# Patient Record
Sex: MALE | Race: OTHER | Employment: UNEMPLOYED | URBAN - METROPOLITAN AREA
[De-identification: names, ages, dates, MRNs, and addresses within clinical notes are randomized per-mention and may not be internally consistent; named-entity substitution may affect disease eponyms.]

---

## 2024-02-16 ENCOUNTER — APPOINTMENT (OUTPATIENT)
Dept: GENERAL RADIOLOGY | Age: 24
End: 2024-02-16

## 2024-02-16 ENCOUNTER — HOSPITAL ENCOUNTER (EMERGENCY)
Age: 24
Discharge: HOME | End: 2024-02-16
Attending: EMERGENCY MEDICINE

## 2024-02-16 VITALS
BODY MASS INDEX: 24.16 KG/M2 | WEIGHT: 145 LBS | TEMPERATURE: 98.2 F | RESPIRATION RATE: 14 BRPM | HEART RATE: 76 BPM | DIASTOLIC BLOOD PRESSURE: 63 MMHG | SYSTOLIC BLOOD PRESSURE: 127 MMHG | OXYGEN SATURATION: 100 % | HEIGHT: 65 IN

## 2024-02-16 DIAGNOSIS — R07.89 CHEST WALL PAIN: Primary | ICD-10-CM

## 2024-02-16 PROCEDURE — 99283 EMERGENCY DEPT VISIT LOW MDM: CPT

## 2024-02-16 PROCEDURE — 71045 X-RAY EXAM CHEST 1 VIEW: CPT

## 2024-02-16 RX ORDER — CYCLOBENZAPRINE HCL 10 MG
10 TABLET ORAL 3 TIMES DAILY PRN
Qty: 30 TABLET | Refills: 0 | Status: SHIPPED | OUTPATIENT
Start: 2024-02-16 | End: 2024-02-26

## 2024-02-16 RX ORDER — IBUPROFEN 800 MG/1
800 TABLET ORAL EVERY 8 HOURS PRN
Qty: 30 TABLET | Refills: 0 | Status: SHIPPED | OUTPATIENT
Start: 2024-02-16

## 2024-02-16 ASSESSMENT — PAIN DESCRIPTION - ORIENTATION: ORIENTATION: RIGHT

## 2024-02-16 ASSESSMENT — PAIN SCALES - GENERAL: PAINLEVEL_OUTOF10: 5

## 2024-02-16 ASSESSMENT — PAIN DESCRIPTION - DESCRIPTORS: DESCRIPTORS: SHARP;SHOOTING

## 2024-02-16 ASSESSMENT — PAIN - FUNCTIONAL ASSESSMENT: PAIN_FUNCTIONAL_ASSESSMENT: 0-10

## 2024-02-16 ASSESSMENT — PAIN DESCRIPTION - LOCATION: LOCATION: CHEST

## 2024-02-16 NOTE — ED PROVIDER NOTES
EMERGENCY DEPARTMENT ENCOUNTER     [facility] EMERGENCY DEPARTMENT     Pt Name: Demar Jones   MRN: [id number]   Birthdate 2000   Date of evaluation: 2/16/2024   Provider: Nikolas Prabhakar MD   PCP: No primary care provider on file.   Note Started: 6:37 PM EST 2/16/24     CHIEF COMPLAINT     Chief Complaint   Patient presents with    Pleurisy     Pt presents with sharp shooting pain with inspiration that radiates to back located on R side of chest x2 days        HISTORY OF PRESENT ILLNESS:  History from : Patient   Limitations to history : None     Demar Jones is a 23 y.o. male who presents for right chest wall pain.  Patient reports the pain has been present for the last 2 or 3 days.  It is worsened by certain movements as well as by coughing or sneezing or deep breaths.  He denies all PE risk factors.  He has been lifting weights recently although this pain did not come on while lifting weights.  He has been constant since onset.  No fevers, chills or sweats.  No vomiting or diarrhea.  No shortness of breath.    Nursing Notes were all reviewed and agreed with or any disagreements were addressed in the HPI.     ROS: Positives and Pertinent negatives as per HPI.    PAST MEDICAL HISTORY     Past medical history:  has no past medical history on file.    Past surgical history:  has no past surgical history on file.      PHYSICAL EXAM:  ED Triage Vitals [02/16/24 1733]   BP Temp Temp Source Pulse Respirations SpO2 Height Weight - Scale   127/63 98.2 °F (36.8 °C) Oral 76 14 100 % 1.651 m (5' 5\") 65.8 kg (145 lb)        Physical Exam   On exam the patient's right chest wall is tender anteriorly near ribs 5 through 7.  Lungs clear to auscultation bilaterally.  Heart regular in rhythm.    DIAGNOSTIC RESULTS       RADIOLOGY:      Interpretation per the Radiologist below, if available at the time of this note:    XR CHEST PORTABLE   Final Result      No acute pulmonary disease.                  EMERGENCY DEPARTMENT